# Patient Record
(demographics unavailable — no encounter records)

---

## 2025-01-24 NOTE — PHYSICAL EXAM
[No Acute Distress] : no acute distress [Normal Oropharynx] : normal oropharynx [Normal Appearance] : normal appearance [No Neck Mass] : no neck mass [Normal Rate/Rhythm] : normal rate/rhythm [Normal S1, S2] : normal s1, s2 [No Murmurs] : no murmurs [No Resp Distress] : no resp distress [Clear to Auscultation Bilaterally] : clear to auscultation bilaterally [No Abnormalities] : no abnormalities [Benign] : benign [Normal Gait] : normal gait [No Clubbing] : no clubbing [No Cyanosis] : no cyanosis [No Edema] : no edema [FROM] : FROM [No Focal Deficits] : no focal deficits [Normal Color/ Pigmentation] : normal color/ pigmentation [Oriented x3] : oriented x3 [Normal Affect] : normal affect [TextBox_11] : Small mouth area with significant obstruction by tongue

## 2025-01-24 NOTE — REASON FOR VISIT
[Initial] : an initial visit [Sleep Evaluation] : sleep evaluation [TextEntry] : New PT here for sleep evaluation.

## 2025-01-24 NOTE — HISTORY OF PRESENT ILLNESS
[Never] : never [Current] : current [TextBox_4] : 39 female no hx tobacco  Presents bec of severe snoring at [place] and  notes gasping for air  no witness apnea +thrashing  +am dry mouth no headache  tired upon arising  no hypersomnolence  but fatigue during  occ   no chemical /asbestos  sleep schedule  1030 pm - 6 00 am [TextBox_27] : Occasional every few months.

## 2025-01-24 NOTE — DISCUSSION/SUMMARY
[FreeTextEntry1] : Ms. Schneider has symptoms consistent with obstructive sleep apnea.  I had extensive discussion with her regarding sleep apnea.  Will perform a home sleep study and pending the results further recommendations regarding therapy will be made.  She understands and agrees. The patient understands and agrees with plan of care. Today's office visit encompassed 46 minutes. I conducted an extensive history,physical exam and reviewed diagnosis and treatment options including diagnostic tests,radiology studies including cat scans and the use of prescription medication.

## 2025-02-17 NOTE — HISTORY OF PRESENT ILLNESS
[Never] : never [Current] : current [TextBox_4] : 39 female co ++snoring  and thrash at nite + am dry mouth +witness apnea and gasping for air feels v tired in am no nap during day

## 2025-02-17 NOTE — REASON FOR VISIT
[Follow-Up] : a follow-up visit [Sleep Evaluation] : sleep evaluation [TextEntry] : Patient had a sleep study and is here for the results

## 2025-02-17 NOTE — PROCEDURE
[FreeTextEntry1] : Home sleep study performed February 7, 2025. RDI 3.8 with O2 sat 91. No evidence of sleep disordered breathing. Positive snoring. Will discuss with patient next visit.

## 2025-02-17 NOTE — DISCUSSION/SUMMARY
[FreeTextEntry1] : Ms. Schneider has significant symptoms consistent with obstructive sleep apnea.  Home sleep studies can miss mild to moderate apnea.  She was charged $1000 for the home study so she is not eager to go to an in-lab study which would be the next step.  I did talk to her about trial of an oral appliance for snoring which can be ordered on [employer].  None of them are highly recommended.  We will order the study with a split-night study and she will investigate whether insurance will cover this.  She will follow-up after the results or any further decisions are made. The patient understands and agrees with plan of care. Today's office visit encompassed 32 minutes which excludes teaching and separately reported services.. I conducted an extensive history, physical exam and reviewed diagnosis and treatment options including diagnostic tests,radiology studies including cat scans and the use of prescription medication.